# Patient Record
Sex: MALE | Race: WHITE | Employment: UNEMPLOYED | URBAN - METROPOLITAN AREA
[De-identification: names, ages, dates, MRNs, and addresses within clinical notes are randomized per-mention and may not be internally consistent; named-entity substitution may affect disease eponyms.]

---

## 2017-05-03 ENCOUNTER — OFFICE VISIT (OUTPATIENT)
Dept: NEPHROLOGY | Facility: CLINIC | Age: 37
End: 2017-05-03

## 2017-05-03 VITALS
DIASTOLIC BLOOD PRESSURE: 98 MMHG | HEART RATE: 68 BPM | RESPIRATION RATE: 16 BRPM | WEIGHT: 212 LBS | BODY MASS INDEX: 31 KG/M2 | SYSTOLIC BLOOD PRESSURE: 154 MMHG

## 2017-05-03 DIAGNOSIS — I10 ESSENTIAL HYPERTENSION: Primary | ICD-10-CM

## 2017-05-03 PROCEDURE — 99214 OFFICE O/P EST MOD 30 MIN: CPT | Performed by: INTERNAL MEDICINE

## 2017-05-03 RX ORDER — LOSARTAN POTASSIUM 50 MG/1
50 TABLET ORAL DAILY
Qty: 30 TABLET | Refills: 11 | Status: SHIPPED | OUTPATIENT
Start: 2017-05-03 | End: 2017-06-02

## 2017-05-03 RX ORDER — AMLODIPINE BESYLATE 5 MG/1
5 TABLET ORAL DAILY
COMMUNITY
End: 2017-12-14

## 2017-05-03 RX ORDER — PYRIDOXINE HCL (VITAMIN B6) 50 MG
1 TABLET ORAL 2 TIMES DAILY
COMMUNITY

## 2017-05-03 NOTE — PROGRESS NOTES
Nephrology Progress Note      Jakob Yeh is a 31year old male. HPI:   Patient presents with:  Hypertension      Juan Vanegas was seen in the nephrology clinic today in follow-up for management of hypertension.   I have not seen him in at least a yea Relation Age of Onset   • Diabetes Father    • Stroke Maternal Grandmother    • Stroke       Paternal Grandparents   • High Blood Pressure Maternal Grandmother    • High Blood Pressure Father    • COPD [Other] [OTHER] Mother    • Hypothyroidism [Other] [OT 90 11/04/2011   GFRAA > 90 11/04/2011   CA 10.0 11/04/2011   ALKPHO 55 11/04/2011   AST 20 11/04/2011   ALT 37 11/04/2011   BILT 0.3 11/04/2011   TP 7.9 11/04/2011   ALB 4.4 11/04/2011    11/04/2011   K 4.5 11/04/2011    11/04/2011   CO2 29.0 1

## 2017-06-19 RX ORDER — LOSARTAN POTASSIUM 50 MG/1
50 TABLET ORAL DAILY
Qty: 90 TABLET | Refills: 1 | Status: SHIPPED | OUTPATIENT
Start: 2017-06-19 | End: 2017-07-19

## 2017-11-02 ENCOUNTER — OFFICE VISIT (OUTPATIENT)
Dept: NEPHROLOGY | Facility: CLINIC | Age: 37
End: 2017-11-02

## 2017-11-02 VITALS — DIASTOLIC BLOOD PRESSURE: 82 MMHG | BODY MASS INDEX: 31 KG/M2 | WEIGHT: 211 LBS | SYSTOLIC BLOOD PRESSURE: 132 MMHG

## 2017-11-02 DIAGNOSIS — I10 ESSENTIAL HYPERTENSION: Primary | ICD-10-CM

## 2017-11-02 PROCEDURE — 99213 OFFICE O/P EST LOW 20 MIN: CPT | Performed by: INTERNAL MEDICINE

## 2017-11-02 NOTE — PROGRESS NOTES
Nephrology Progress Note      Malena Browning is a 30year old male. HPI:   Patient presents with:  Hypertension      Vincent Moore was seen in the nephrology clinic today in follow-up for management of hypertension.   Since his last clinic visit he report Take 5 mg by mouth daily. Disp:  Rfl:    Pyridoxine HCl (B-6) 100 MG Oral Tab Take 1 tablet by mouth. Disp:  Rfl:    LevETIRAcetam (KEPPRA OR) Take 1,000 mg by mouth 2 (two) times daily.    Disp:  Rfl:    CarBAMazepine  MG Oral Capsule SR 12 Hr Take 3

## 2017-12-14 RX ORDER — LOSARTAN POTASSIUM 50 MG/1
TABLET ORAL
Qty: 90 TABLET | Refills: 1 | OUTPATIENT
Start: 2017-12-14

## 2017-12-14 RX ORDER — LOSARTAN POTASSIUM 50 MG/1
50 TABLET ORAL DAILY
Qty: 90 TABLET | Refills: 1 | Status: SHIPPED | OUTPATIENT
Start: 2017-12-14 | End: 2018-06-21

## 2018-06-21 RX ORDER — LOSARTAN POTASSIUM 50 MG/1
50 TABLET ORAL DAILY
Qty: 90 TABLET | Refills: 1 | Status: SHIPPED | OUTPATIENT
Start: 2018-06-21 | End: 2018-12-15

## 2018-11-08 ENCOUNTER — OFFICE VISIT (OUTPATIENT)
Dept: NEPHROLOGY | Facility: CLINIC | Age: 38
End: 2018-11-08
Payer: COMMERCIAL

## 2018-11-08 VITALS — SYSTOLIC BLOOD PRESSURE: 132 MMHG | WEIGHT: 206 LBS | BODY MASS INDEX: 30 KG/M2 | DIASTOLIC BLOOD PRESSURE: 78 MMHG

## 2018-11-08 DIAGNOSIS — I10 ESSENTIAL HYPERTENSION: Primary | ICD-10-CM

## 2018-11-08 PROCEDURE — 99214 OFFICE O/P EST MOD 30 MIN: CPT | Performed by: INTERNAL MEDICINE

## 2018-11-08 NOTE — PROGRESS NOTES
Nephrology Progress Note      Shelby July is a 41year old male. HPI:   Patient presents with:  Hypertension      Matt Prather returns to the nephrology clinic today in follow-up for management of hypertension.   Since his last clinic visit he reports SR 12 Hr Take 600 mg by mouth 2 (two) times daily.    Disp:  Rfl:        Allergies:  No Known Allergies      ROS:     Denies fever/chills  Denies wt loss/gain  Denies HA or visual changes  Denies CP or palpitations  Denies SOB/cough/hemoptysis  Denies abd o 11/04/2011    EOABSO 0.0 11/04/2011    BAABSO 0.0 11/04/2011     No results found for: Rachael Jeremiah, MIALBURINE, MCRRATIOUR, MALBCRECALC, MICROALBUMIN, CREAUR, MALBCREACALC  No results found for: Kirk Stanley, GEORGE, MAJOR, MAXI, BLOODURINE, P

## 2018-12-17 RX ORDER — LOSARTAN POTASSIUM 50 MG/1
50 TABLET ORAL DAILY
Qty: 90 TABLET | Refills: 1 | Status: SHIPPED | OUTPATIENT
Start: 2018-12-17 | End: 2019-01-16

## 2019-05-21 RX ORDER — LOSARTAN POTASSIUM 50 MG/1
50 TABLET ORAL DAILY
Qty: 90 TABLET | Refills: 1 | Status: SHIPPED | OUTPATIENT
Start: 2019-05-21 | End: 2019-06-20

## 2019-11-06 DIAGNOSIS — I10 ESSENTIAL HYPERTENSION: Primary | ICD-10-CM

## 2019-11-14 ENCOUNTER — OFFICE VISIT (OUTPATIENT)
Dept: NEPHROLOGY | Facility: CLINIC | Age: 39
End: 2019-11-14

## 2019-11-14 ENCOUNTER — TELEPHONE (OUTPATIENT)
Dept: NEPHROLOGY | Facility: CLINIC | Age: 39
End: 2019-11-14

## 2019-11-14 VITALS — DIASTOLIC BLOOD PRESSURE: 88 MMHG | SYSTOLIC BLOOD PRESSURE: 154 MMHG | BODY MASS INDEX: 30 KG/M2 | WEIGHT: 205 LBS

## 2019-11-14 DIAGNOSIS — I10 ESSENTIAL HYPERTENSION: Primary | ICD-10-CM

## 2019-11-14 RX ORDER — LOSARTAN POTASSIUM 100 MG/1
100 TABLET ORAL DAILY
Qty: 90 TABLET | Refills: 3 | Status: SHIPPED | OUTPATIENT
Start: 2019-11-14

## 2019-11-14 NOTE — TELEPHONE ENCOUNTER
Yesterday, I called pt's cell & home number, trying to confirm today's appt. No answer at either number. Pt failed to keep appt today.

## 2019-11-14 NOTE — PROGRESS NOTES
Nephrology Progress Note      Olvin Snell is a 42year old male. HPI:   Patient presents with:  Hypertension      Mr. Latrice Barone was seen in the nephrology clinic today in follow-up for management of hypertension.   Since his last clinic visit he notes th Allergies:  No Known Allergies      ROS:     Denies fever/chills  Denies wt loss/gain  Denies HA or visual changes  Denies CP or palpitations  Denies SOB/cough/hemoptysis  Denies abd or flank pain  Denies N/V/D  Denies change in urinary habits or duglas

## 2020-04-02 ENCOUNTER — LAB ENCOUNTER (OUTPATIENT)
Dept: LAB | Facility: HOSPITAL | Age: 40
End: 2020-04-02
Attending: INTERNAL MEDICINE
Payer: MEDICAID

## 2020-04-02 ENCOUNTER — TELEPHONE (OUTPATIENT)
Dept: NEPHROLOGY | Facility: CLINIC | Age: 40
End: 2020-04-02

## 2020-04-02 DIAGNOSIS — R60.9 EDEMA, UNSPECIFIED TYPE: ICD-10-CM

## 2020-04-02 DIAGNOSIS — R60.9 EDEMA, UNSPECIFIED TYPE: Primary | ICD-10-CM

## 2020-04-02 PROCEDURE — 81003 URINALYSIS AUTO W/O SCOPE: CPT

## 2020-04-02 PROCEDURE — 36415 COLL VENOUS BLD VENIPUNCTURE: CPT

## 2020-04-02 PROCEDURE — 80053 COMPREHEN METABOLIC PANEL: CPT

## 2020-04-02 PROCEDURE — 82570 ASSAY OF URINE CREATININE: CPT

## 2020-04-02 PROCEDURE — 84156 ASSAY OF PROTEIN URINE: CPT

## 2020-04-02 PROCEDURE — 85025 COMPLETE CBC W/AUTO DIFF WBC: CPT

## 2020-04-02 RX ORDER — CHLORTHALIDONE 25 MG/1
25 TABLET ORAL DAILY
Qty: 30 TABLET | Refills: 0 | Status: SHIPPED | OUTPATIENT
Start: 2020-04-02 | End: 2020-05-02

## 2020-04-02 NOTE — TELEPHONE ENCOUNTER
Dr. Anai Elias pt called. His feet are so swollen he can hardly get his shoes on. Pls call him. [phone].

## 2020-04-02 NOTE — TELEPHONE ENCOUNTER
Pt called about worsening edema over the last few months; now can't get shoes on.   On losartan  Not taking any diuretics  Will have him come in for lab check  Asked pt to monitor BP  Low salt diet    He will plan to come in today for above; will f/u

## 2020-04-28 ENCOUNTER — TELEPHONE (OUTPATIENT)
Dept: NEPHROLOGY | Facility: CLINIC | Age: 40
End: 2020-04-28

## 2020-04-29 ENCOUNTER — TELEPHONE (OUTPATIENT)
Dept: NEPHROLOGY | Facility: CLINIC | Age: 40
End: 2020-04-29

## 2020-04-29 RX ORDER — FUROSEMIDE 20 MG/1
20 TABLET ORAL 2 TIMES DAILY
Qty: 30 TABLET | Refills: 3 | Status: SHIPPED | OUTPATIENT
Start: 2020-04-29

## 2020-04-29 NOTE — TELEPHONE ENCOUNTER
Pt called today to discuss swelling in feet/ankles B. He had called earlier in the month and chlorthalidone was given but no sig improvement. Urinalysis was done then and was normal and creat normal as well.   Would try furosemide 20 mg daily and stop chl

## 2020-11-24 ENCOUNTER — NURSE ONLY (OUTPATIENT)
Dept: NEPHROLOGY | Facility: CLINIC | Age: 40
End: 2020-11-24

## 2020-11-24 ENCOUNTER — TELEPHONE (OUTPATIENT)
Dept: NEPHROLOGY | Facility: CLINIC | Age: 40
End: 2020-11-24

## 2024-01-01 ENCOUNTER — HOSPITAL ENCOUNTER (EMERGENCY)
Facility: HOSPITAL | Age: 44
Discharge: HOME | End: 2024-01-02
Attending: EMERGENCY MEDICINE
Payer: MEDICARE

## 2024-01-01 ENCOUNTER — APPOINTMENT (OUTPATIENT)
Dept: GENERAL RADIOLOGY | Facility: HOSPITAL | Age: 44
End: 2024-01-01
Attending: EMERGENCY MEDICINE
Payer: MEDICARE

## 2024-01-01 DIAGNOSIS — G89.29 CHRONIC ABDOMINAL PAIN: Primary | ICD-10-CM

## 2024-01-01 DIAGNOSIS — R19.7 NAUSEA VOMITING AND DIARRHEA: ICD-10-CM

## 2024-01-01 DIAGNOSIS — D72.829 LEUKOCYTOSIS, UNSPECIFIED TYPE: ICD-10-CM

## 2024-01-01 DIAGNOSIS — R11.2 NAUSEA VOMITING AND DIARRHEA: ICD-10-CM

## 2024-01-01 DIAGNOSIS — R10.9 CHRONIC ABDOMINAL PAIN: Primary | ICD-10-CM

## 2024-01-01 LAB
ALBUMIN SERPL-MCNC: 3.9 G/DL (ref 3.4–5)
ALBUMIN/GLOB SERPL: 1.2 {RATIO} (ref 1–2)
ALP LIVER SERPL-CCNC: 132 U/L
ALT SERPL-CCNC: 52 U/L
ANION GAP SERPL CALC-SCNC: 3 MMOL/L (ref 0–18)
AST SERPL-CCNC: 28 U/L (ref 15–37)
BASOPHILS # BLD AUTO: 0.07 X10(3) UL (ref 0–0.2)
BASOPHILS NFR BLD AUTO: 0.4 %
BILIRUB SERPL-MCNC: 0.2 MG/DL (ref 0.1–2)
BILIRUB UR QL STRIP.AUTO: NEGATIVE
BUN BLD-MCNC: 15 MG/DL (ref 9–23)
CALCIUM BLD-MCNC: 9 MG/DL (ref 8.5–10.1)
CHLORIDE SERPL-SCNC: 113 MMOL/L (ref 98–112)
CLARITY UR REFRACT.AUTO: CLEAR
CO2 SERPL-SCNC: 26 MMOL/L (ref 21–32)
COLOR UR AUTO: YELLOW
CREAT BLD-MCNC: 0.94 MG/DL
EGFRCR SERPLBLD CKD-EPI 2021: 104 ML/MIN/1.73M2 (ref 60–?)
EOSINOPHIL # BLD AUTO: 0.33 X10(3) UL (ref 0–0.7)
EOSINOPHIL NFR BLD AUTO: 2 %
ERYTHROCYTE [DISTWIDTH] IN BLOOD BY AUTOMATED COUNT: 13 %
FLUAV + FLUBV RNA SPEC NAA+PROBE: NEGATIVE
FLUAV + FLUBV RNA SPEC NAA+PROBE: NEGATIVE
GLOBULIN PLAS-MCNC: 3.3 G/DL (ref 2.8–4.4)
GLUCOSE BLD-MCNC: 116 MG/DL (ref 70–99)
GLUCOSE UR STRIP.AUTO-MCNC: NORMAL MG/DL
HCT VFR BLD AUTO: 44.2 %
HGB BLD-MCNC: 14.9 G/DL
IMM GRANULOCYTES # BLD AUTO: 0.07 X10(3) UL (ref 0–1)
IMM GRANULOCYTES NFR BLD: 0.4 %
KETONES UR STRIP.AUTO-MCNC: NEGATIVE MG/DL
LEUKOCYTE ESTERASE UR QL STRIP.AUTO: NEGATIVE
LIPASE SERPL-CCNC: 53 U/L (ref 13–75)
LYMPHOCYTES # BLD AUTO: 2.99 X10(3) UL (ref 1–4)
LYMPHOCYTES NFR BLD AUTO: 18.6 %
MCH RBC QN AUTO: 29.2 PG (ref 26–34)
MCHC RBC AUTO-ENTMCNC: 33.7 G/DL (ref 31–37)
MCV RBC AUTO: 86.7 FL
MONOCYTES # BLD AUTO: 1.1 X10(3) UL (ref 0.1–1)
MONOCYTES NFR BLD AUTO: 6.8 %
NEUTROPHILS # BLD AUTO: 11.54 X10 (3) UL (ref 1.5–7.7)
NEUTROPHILS # BLD AUTO: 11.54 X10(3) UL (ref 1.5–7.7)
NEUTROPHILS NFR BLD AUTO: 71.8 %
NITRITE UR QL STRIP.AUTO: NEGATIVE
OSMOLALITY SERPL CALC.SUM OF ELEC: 296 MOSM/KG (ref 275–295)
PH UR STRIP.AUTO: 6 [PH] (ref 5–8)
PLATELET # BLD AUTO: 549 10(3)UL (ref 150–450)
POTASSIUM SERPL-SCNC: 3.5 MMOL/L (ref 3.5–5.1)
PROT SERPL-MCNC: 7.2 G/DL (ref 6.4–8.2)
RBC # BLD AUTO: 5.1 X10(6)UL
RBC UR QL AUTO: NEGATIVE
RSV RNA SPEC NAA+PROBE: NEGATIVE
SARS-COV-2 RNA RESP QL NAA+PROBE: NOT DETECTED
SODIUM SERPL-SCNC: 142 MMOL/L (ref 136–145)
SP GR UR STRIP.AUTO: 1.03 (ref 1–1.03)
UROBILINOGEN UR STRIP.AUTO-MCNC: NORMAL MG/DL
WBC # BLD AUTO: 16.1 X10(3) UL (ref 4–11)

## 2024-01-01 PROCEDURE — 96360 HYDRATION IV INFUSION INIT: CPT

## 2024-01-01 PROCEDURE — 82077 ASSAY SPEC XCP UR&BREATH IA: CPT | Performed by: EMERGENCY MEDICINE

## 2024-01-01 PROCEDURE — 85025 COMPLETE CBC W/AUTO DIFF WBC: CPT | Performed by: EMERGENCY MEDICINE

## 2024-01-01 PROCEDURE — 71045 X-RAY EXAM CHEST 1 VIEW: CPT | Performed by: EMERGENCY MEDICINE

## 2024-01-01 PROCEDURE — 80053 COMPREHEN METABOLIC PANEL: CPT | Performed by: EMERGENCY MEDICINE

## 2024-01-01 PROCEDURE — 96361 HYDRATE IV INFUSION ADD-ON: CPT

## 2024-01-01 PROCEDURE — 81003 URINALYSIS AUTO W/O SCOPE: CPT | Performed by: EMERGENCY MEDICINE

## 2024-01-01 PROCEDURE — 83690 ASSAY OF LIPASE: CPT | Performed by: EMERGENCY MEDICINE

## 2024-01-01 PROCEDURE — 0241U SARS-COV-2/FLU A AND B/RSV BY PCR (GENEXPERT): CPT | Performed by: EMERGENCY MEDICINE

## 2024-01-01 PROCEDURE — 99284 EMERGENCY DEPT VISIT MOD MDM: CPT

## 2024-01-01 PROCEDURE — 87430 STREP A AG IA: CPT | Performed by: EMERGENCY MEDICINE

## 2024-01-01 PROCEDURE — 99285 EMERGENCY DEPT VISIT HI MDM: CPT

## 2024-01-02 ENCOUNTER — APPOINTMENT (OUTPATIENT)
Dept: CT IMAGING | Facility: HOSPITAL | Age: 44
End: 2024-01-02
Attending: EMERGENCY MEDICINE
Payer: MEDICARE

## 2024-01-02 VITALS
RESPIRATION RATE: 16 BRPM | OXYGEN SATURATION: 97 % | TEMPERATURE: 99 F | HEART RATE: 84 BPM | WEIGHT: 205 LBS | DIASTOLIC BLOOD PRESSURE: 80 MMHG | SYSTOLIC BLOOD PRESSURE: 150 MMHG | BODY MASS INDEX: 30.36 KG/M2 | HEIGHT: 69 IN

## 2024-01-02 LAB
AMPHET UR QL SCN: NEGATIVE
BENZODIAZ UR QL SCN: NEGATIVE
CANNABINOIDS UR QL SCN: NEGATIVE
COCAINE UR QL: NEGATIVE
CREAT UR-SCNC: 217 MG/DL
ETHANOL SERPL-MCNC: <3 MG/DL (ref ?–3)
MDMA UR QL SCN: NEGATIVE
OPIATES UR QL SCN: NEGATIVE
OXYCODONE UR QL SCN: NEGATIVE

## 2024-01-02 PROCEDURE — 74177 CT ABD & PELVIS W/CONTRAST: CPT | Performed by: EMERGENCY MEDICINE

## 2024-01-02 PROCEDURE — 80307 DRUG TEST PRSMV CHEM ANLYZR: CPT | Performed by: EMERGENCY MEDICINE

## 2024-01-02 NOTE — ED PROVIDER NOTES
Patient Seen in: [facility] Emergency Department      History     Chief Complaint   Patient presents with    Abdomen/Flank Pain    Nausea/Vomiting/Diarrhea     Stated Complaint: abd pain, + N/V/D    Subjective:   HPI    Patient is a 43-year-old male presenting to the ED with acute exacerbation of chronic abdominal pain.  Patient reports that he has had generalized abdominal pain for months but felt like it was worse this evening prompting him to come in for further evaluation.  He also experiences intermittent nausea, vomiting, and diarrhea.  Patient reports a single episode of vomiting in the last 24 hours and a single episode of loose stool in the last 24 hours.  No fevers or chills.  No prior abdominal surgery.  Patient does report a history of epilepsy.  He also states he feels stressed as he recently moved home from [place] to take care of his parents who are ill.  No known sick contacts.  No exposure to foodborne illness.  No prior diagnosis of underlying GI illness.  No recent change in medications.  No change in appetite or diet.  No alcohol or drugs.    Objective:   Past Medical History:   Diagnosis Date    Acute bronchitis     Acute frontal sinusitis     Acute maxillary sinusitis     Acute pharyngitis     AOM (acute otitis media)     left    Chronic diarrhea     Club foot     Cough     acute    Epilepsy (HCC)     Lipid screening 11/04/2011              Past Surgical History:   Procedure Laterality Date    OTHER SURGICAL HISTORY  1982    surgical repair of club foot                Social History     Socioeconomic History    Marital status:    Tobacco Use    Smoking status: Never    Smokeless tobacco: Never   Substance and Sexual Activity    Alcohol use: Not Currently     Comment: Nonalcohol beer    Drug use: Never   Other Topics Concern    Caffeine Concern Yes              Review of Systems    Positive for stated complaint: abd pain, + N/V/D  Other systems are as noted in HPI.  Constitutional  and vital signs reviewed.      All other systems reviewed and negative except as noted above.    Physical Exam     ED Triage Vitals [01/01/24 2140]   BP (!) 150/92   Pulse 96   Resp 12   Temp 99.3 °F (37.4 °C)   Temp src Oral   SpO2 100 %   O2 Device None (Room air)       Current:/80   Pulse 84   Temp 99.3 °F (37.4 °C) (Oral)   Resp 16   Ht 175.3 cm (5' 9\")   Wt 93 kg   SpO2 97%   BMI 30.28 kg/m²         Physical Exam  Vitals and nursing note reviewed.   Constitutional:       General: He is not in acute distress.     Appearance: Normal appearance. He is well-developed. He is not ill-appearing.   HENT:      Head: Normocephalic and atraumatic.      Right Ear: External ear normal.      Left Ear: External ear normal.      Nose: Nose normal.      Mouth/Throat:      Mouth: Mucous membranes are moist.      Pharynx: Oropharynx is clear. No posterior oropharyngeal erythema.   Eyes:      Conjunctiva/sclera: Conjunctivae normal.   Cardiovascular:      Rate and Rhythm: Normal rate and regular rhythm.   Pulmonary:      Effort: Pulmonary effort is normal. No respiratory distress.      Breath sounds: Normal breath sounds.   Abdominal:      General: Abdomen is flat. Bowel sounds are normal. There is no distension.      Palpations: Abdomen is soft.      Tenderness: There is abdominal tenderness. There is no guarding or rebound.      Comments: Minimal diffuse tenderness on exam without localization.   Musculoskeletal:      Right lower leg: No edema.      Left lower leg: No edema.   Skin:     General: Skin is warm.      Capillary Refill: Capillary refill takes less than 2 seconds.      Findings: No rash.   Neurological:      General: No focal deficit present.      Mental Status: He is alert and oriented to person, place, and time.   Psychiatric:         Mood and Affect: Mood normal.         Behavior: Behavior normal.               ED Course     Labs Reviewed   COMP METABOLIC PANEL (14) - Abnormal; Notable for the  following components:       Result Value    Glucose 116 (*)     Chloride 113 (*)     Calculated Osmolality 296 (*)     Alkaline Phosphatase 132 (*)     All other components within normal limits   URINALYSIS, ROUTINE - Abnormal; Notable for the following components:    Protein Urine Trace (*)     All other components within normal limits   CBC W/ DIFFERENTIAL - Abnormal; Notable for the following components:    WBC 16.1 (*)     .0 (*)     Neutrophil Absolute Prelim 11.54 (*)     Neutrophil Absolute 11.54 (*)     Monocyte Absolute 1.10 (*)     All other components within normal limits   LIPASE - Normal   ETHYL ALCOHOL - Normal   SARS-COV-2/FLU A AND B/RSV BY PCR (GENEXPERT) - Normal    Narrative:     This test is intended for the qualitative detection and differentiation of SARS-CoV-2, influenza A, influenza B, and respiratory syncytial virus (RSV) viral RNA in nasopharyngeal or nares swabs from individuals suspected of respiratory viral infection consistent with COVID-19 by their healthcare provider. Signs and symptoms of respiratory viral infection due to SARS-CoV-2, influenza, and RSV can be similar.    Test performed using the Xpert Xpress SARS-CoV-2/FLU/RSV (real time RT-PCR)  assay on the [employer] instrument, [employer], [place] [address].   This test is being used under the Food and Drug Administration's Emergency Use Authorization.    The authorized Fact Sheet for Healthcare Providers for this assay is available upon request from the laboratory.   RAPID STREP A SCREEN (LC) - Normal    Narrative:     A confirmatory culture is recommended if clinically indicated.   CBC WITH DIFFERENTIAL WITH PLATELET    Narrative:     The following orders were created for panel order CBC With Differential With Platelet.  Procedure                               Abnormality         Status                     ---------                               -----------         ------                     CBC W/ DIFFERENTIAL[[id number]]           Abnormal            Final result                 Please view results for these tests on the individual orders.   DRUG SCREEN 7 W/OUT CONFIRMATION, URINE    Narrative:     Results of the Urine Drug Screen should be used only for medical purposes.   RAINBOW DRAW LAVENDER   RAINBOW DRAW LIGHT GREEN   RAINBOW DRAW GOLD                     MDM      History obtained from patient.     Differential diagnosis includes IBS, inflammatory bowel disease, food intolerance, viral illness, colitis, pancreatitis, gallbladder etiology.    Previous records reviewed.  Per neurology notes, patient has a history of cognitive impairment.  Patient did have recent visit for diarrhea.  This was December 27, 2023.  It also appears that there is a previous diagnosis of irritable bowel syndrome noted on September 26, 2023.    Testing considered and ordered includes CBC, CMP, lipase, viral testing, UA.  Strep test was ordered by triage.  There was note that patient may have appeared intoxicated although I believe that his appearance is secondary to chronic underlying cognitive impairment as noted in previous medical records.  The patient answers all questions appropriately but is slow to answer questions at times.  His alcohol was also ordered given concern expressed by triage notes.  UDS also ordered.    I reviewed all results.  CBC with leukocytosis and WBC at 16.1 with neutrophilic shift.  Platelets 549.  CMP also reviewed, unremarkable other than chloride at 113.  UA negative.  Alcohol and UDS negative.  Lipase normal.  Strep testing negative.  Viral testing negative.  CT was ordered as patient's temp on arrival was 99.3 with WBC at 16.1 although he has minimal tenderness on exam.  No complaints of any cough or URI symptoms, however chest x-ray was ordered to further out any potential source for infection.    Chest x-ray was independently reviewed by myself without any findings for infiltrate or consolidation.  I also reviewed the  official report which shows        XR CHEST AP PORTABLE  (CPT=71045)    Result Date: 1/1/2024  PROCEDURE:  XR CHEST AP PORTABLE  (CPT=71045)  TECHNIQUE:  AP chest radiograph was obtained.  COMPARISON:  None.  INDICATIONS:  abd pain, + N/V/D  PATIENT STATED HISTORY: (As transcribed by Technologist)  Patient complains of epigastric pain with nausea, vomting, cough, sweating episodes.    FINDINGS:  Lung volumes are upper normal.  No consolidation.  CP angles are sharp.  Heart and pulmonary vessels are normal caliber.  Mediastinal contours are smooth.             CONCLUSION:  No evidence of active cardiopulmonary disease.   LOCATION:  [facility]      Dictated by (CST): Austyn Baer MD on 1/01/2024 at 11:12 PM     Finalized by (CST): Austyn Baer MD on 1/01/2024 at 11:12 PM        Comment: CT ABDOMEN PELVIS WITH IV CONTRAST      IMPRESSION:  CT ABDOMEN PELVIS:  -No evidence of acute intra-abdominal or intra-pelvic abnormality.    -Unremarkable liver, gallbladder, stomach, spleen, pancreas, adrenal glands, kidneys and aorta.  -Unremarkable appendix, small bowel and large bowel.  -No ascites or pneumoperitoneum.      Interventions in care included normal saline bolus.  Patient did not require anything additional at this time for pain.  No active nausea or vomiting in the ED.      Patient was screened and evaluated during this visit.  As the treating physician attending to the patient, I determined within reasonable clinical confidence and prior to discharge, that an emergency medical condition was not or was no longer present.  There was no indication for further evaluation, treatment, or admission on an emergency basis.  Comprehensive verbal and written discharge and follow-up instructions were provided to help prevent relapse or worsening.  Patient was instructed to follow-up with their primary care provider for further evaluation and treatment, return immediately to ER for worsening, concerning, new, or changing/persisting  symptoms. I discussed the case with the patient and they had no questions, complaints, or concerns.  Patient was comfortable going home.  May require further evaluation outpatient GI given chronicity of symptoms.  Patient did not have any diarrhea in the ED she was sent for stool studies.                                           Medical Decision Making      Disposition and Plan     Clinical Impression:  1. Chronic abdominal pain    2. Nausea vomiting and diarrhea    3. Leukocytosis, unspecified type         Disposition:  Discharge  1/2/2024  2:13 am    Follow-up:  Edin Newton MD  [address] [address]  [phone]    Schedule an appointment as soon as possible for a visit in 2 day(s)      [facility] GASTROENTEROLOGY [address] [address]  Schedule an appointment as soon as possible for a visit in 2 day(s)      [facility] Emergency Department  [address] [place] [id number] [phone]  Follow up  IF SYMPTOMS WORSEN, PERSIST, OR NEW SYMPTOMS DEVEL          Medications Prescribed:  Discharge Medication List as of 1/2/2024  2:14 AM

## 2024-01-02 NOTE — ED INITIAL ASSESSMENT (HPI)
Patient states he's been vomiting for months. Having constant diarrhea. Cough, throwing up. No appetite. States he's \"all stressed out. I don't know what to do. My stomach is all pains. I don't know if it's nerves or what.\"   Patient states he's been seeing doctors in [place], [place] without any answers.   Odd affect ?Intoxication  States he would like to stay here tonight, it's \"all chaos where I live. My parents. And their medical problems.\" Denies SI/HI and homelessness.

## 2024-10-24 ENCOUNTER — TELEPHONE (OUTPATIENT)
Dept: INTERNAL MEDICINE | Age: 44
End: 2024-10-24

## 2024-11-01 ENCOUNTER — OFFICE VISIT (OUTPATIENT)
Dept: FAMILY MEDICINE | Age: 44
End: 2024-11-01

## 2024-11-01 VITALS
TEMPERATURE: 97.4 F | HEART RATE: 75 BPM | HEIGHT: 69 IN | DIASTOLIC BLOOD PRESSURE: 82 MMHG | SYSTOLIC BLOOD PRESSURE: 110 MMHG | WEIGHT: 240 LBS | BODY MASS INDEX: 35.55 KG/M2 | OXYGEN SATURATION: 100 %

## 2024-11-01 DIAGNOSIS — K58.1 IRRITABLE BOWEL SYNDROME WITH CONSTIPATION: ICD-10-CM

## 2024-11-01 DIAGNOSIS — E66.01 CLASS 2 SEVERE OBESITY WITH BODY MASS INDEX (BMI) OF 35 TO 39.9 WITH SERIOUS COMORBIDITY (CMD): ICD-10-CM

## 2024-11-01 DIAGNOSIS — E66.812 CLASS 2 SEVERE OBESITY WITH BODY MASS INDEX (BMI) OF 35 TO 39.9 WITH SERIOUS COMORBIDITY (CMD): ICD-10-CM

## 2024-11-01 DIAGNOSIS — Q66.89 CLUBFOOT OF BOTH LOWER EXTREMITIES: ICD-10-CM

## 2024-11-01 DIAGNOSIS — G40.909 SEIZURE DISORDER  (CMD): ICD-10-CM

## 2024-11-01 DIAGNOSIS — E78.2 HYPERLIPIDEMIA, MIXED: ICD-10-CM

## 2024-11-01 DIAGNOSIS — Z23 NEED FOR INFLUENZA VACCINATION: Primary | ICD-10-CM

## 2024-11-01 DIAGNOSIS — Z23 NEED FOR COVID-19 VACCINE: ICD-10-CM

## 2024-11-01 RX ORDER — LEVETIRACETAM 1000 MG/1
2000 TABLET ORAL
COMMUNITY
Start: 2024-07-03 | End: 2025-01-01

## 2024-11-01 RX ORDER — DICYCLOMINE HCL 20 MG
10-20 TABLET ORAL 3 TIMES DAILY PRN
COMMUNITY
Start: 2024-07-23

## 2024-11-01 RX ORDER — IBUPROFEN 200 MG
CAPSULE ORAL
COMMUNITY
Start: 2024-07-20

## 2024-11-01 RX ORDER — ZONISAMIDE 100 MG/1
300 CAPSULE ORAL
COMMUNITY
Start: 2024-07-03 | End: 2024-12-31

## 2024-11-01 RX ORDER — PREGABALIN 150 MG/1
CAPSULE ORAL
COMMUNITY

## 2024-11-01 RX ORDER — LACOSAMIDE 200 MG/1
200 TABLET ORAL
COMMUNITY
Start: 2024-07-06 | End: 2025-01-03

## 2024-11-01 RX ORDER — CLOBAZAM 10 MG/1
10 TABLET ORAL
COMMUNITY
Start: 2024-07-06

## 2024-11-01 RX ORDER — ERYTHROMYCIN 5 MG/G
OINTMENT OPHTHALMIC
COMMUNITY
Start: 2024-06-05

## 2024-11-01 RX ORDER — CARBAMAZEPINE 300 MG/1
600 CAPSULE, EXTENDED RELEASE ORAL
COMMUNITY

## 2024-11-01 RX ORDER — LOSARTAN POTASSIUM AND HYDROCHLOROTHIAZIDE 25; 100 MG/1; MG/1
TABLET ORAL
COMMUNITY

## 2024-11-01 RX ORDER — FUROSEMIDE 20 MG/1
20 TABLET ORAL DAILY
COMMUNITY

## 2024-11-01 RX ORDER — CELECOXIB 200 MG/1
CAPSULE ORAL
COMMUNITY

## 2024-11-01 RX ORDER — ESOMEPRAZOLE MAGNESIUM 40 MG/1
CAPSULE, DELAYED RELEASE ORAL PRN
COMMUNITY
Start: 2024-07-23

## 2024-11-01 RX ORDER — TRAZODONE HYDROCHLORIDE 50 MG/1
TABLET, FILM COATED ORAL
COMMUNITY
Start: 2024-07-23

## 2024-11-01 SDOH — ECONOMIC STABILITY: FOOD INSECURITY: WITHIN THE PAST 12 MONTHS, THE FOOD YOU BOUGHT JUST DIDN'T LAST AND YOU DIDN'T HAVE MONEY TO GET MORE.: OFTEN TRUE

## 2024-11-01 SDOH — ECONOMIC STABILITY: HOUSING INSECURITY: WHAT IS YOUR LIVING SITUATION TODAY?: I HAVE A PLACE TO LIVE TODAY, BUT I AM WORRIED ABOUT LOSING IT IN THE FUTURE

## 2024-11-01 SDOH — ECONOMIC STABILITY: FOOD INSECURITY: WITHIN THE PAST 12 MONTHS, THE FOOD YOU BOUGHT JUST DIDN'T LAST AND YOU DIDN'T HAVE MONEY TO GET MORE.: SOMETIMES TRUE

## 2024-11-01 SDOH — ECONOMIC STABILITY: HOUSING INSECURITY: DO YOU HAVE PROBLEMS WITH ANY OF THE FOLLOWING?: MOLD

## 2024-11-01 SDOH — ECONOMIC STABILITY: GENERAL: WOULD YOU LIKE HELP WITH ANY OF THE FOLLOWING NEEDS?: FOOD;HOUSING;UTILITIES;TRANSPORTATION

## 2024-11-01 SDOH — ECONOMIC STABILITY: TRANSPORTATION INSECURITY
IN THE PAST 12 MONTHS, HAS LACK OF RELIABLE TRANSPORTATION KEPT YOU FROM MEDICAL APPOINTMENTS, MEETINGS, WORK OR FROM GETTING THINGS NEEDED FOR DAILY LIVING?: NO

## 2024-11-01 ASSESSMENT — PATIENT HEALTH QUESTIONNAIRE - PHQ9
SUM OF ALL RESPONSES TO PHQ9 QUESTIONS 1 AND 2: 2
2. FEELING DOWN, DEPRESSED OR HOPELESS: SEVERAL DAYS
1. LITTLE INTEREST OR PLEASURE IN DOING THINGS: SEVERAL DAYS
CLINICAL INTERPRETATION OF PHQ2 SCORE: NO FURTHER SCREENING NEEDED
SUM OF ALL RESPONSES TO PHQ9 QUESTIONS 1 AND 2: 2

## 2024-11-01 ASSESSMENT — SOCIAL DETERMINANTS OF HEALTH (SDOH): IN THE PAST 12 MONTHS, HAS THE ELECTRIC, GAS, OIL, OR WATER COMPANY THREATENED TO SHUT OFF SERVICE IN YOUR HOME?: YES

## 2024-11-11 ENCOUNTER — TELEPHONE (OUTPATIENT)
Dept: PODIATRY | Age: 44
End: 2024-11-11

## 2024-11-12 ENCOUNTER — TELEPHONE (OUTPATIENT)
Dept: GASTROENTEROLOGY | Age: 44
End: 2024-11-12

## 2024-11-22 DIAGNOSIS — K58.2 IRRITABLE BOWEL SYNDROME WITH BOTH CONSTIPATION AND DIARRHEA: ICD-10-CM

## 2024-11-22 DIAGNOSIS — L98.9 SKIN ABNORMALITY: ICD-10-CM

## 2024-11-22 DIAGNOSIS — K21.9 GASTROESOPHAGEAL REFLUX DISEASE, UNSPECIFIED WHETHER ESOPHAGITIS PRESENT: Primary | ICD-10-CM

## 2024-11-25 RX ORDER — ESOMEPRAZOLE MAGNESIUM 40 MG/1
40 CAPSULE, DELAYED RELEASE ORAL DAILY
Qty: 90 CAPSULE | Refills: 1 | Status: SHIPPED | OUTPATIENT
Start: 2024-11-25

## 2024-11-25 RX ORDER — CLOBAZAM 10 MG/1
10 TABLET ORAL
OUTPATIENT
Start: 2024-11-25

## 2024-11-25 RX ORDER — IBUPROFEN 200 MG
CAPSULE ORAL
Qty: 28 G | Refills: 3 | Status: SHIPPED | OUTPATIENT
Start: 2024-11-25

## 2024-11-25 RX ORDER — LACOSAMIDE 200 MG/1
200 TABLET ORAL
Qty: 60 TABLET | OUTPATIENT
Start: 2024-11-25 | End: 2025-05-25

## 2024-11-25 RX ORDER — PREGABALIN 150 MG/1
150 CAPSULE ORAL 2 TIMES DAILY
Qty: 180 CAPSULE | Refills: 1 | OUTPATIENT
Start: 2024-11-25

## 2024-11-25 RX ORDER — DICYCLOMINE HCL 20 MG
10-20 TABLET ORAL 3 TIMES DAILY
Qty: 90 TABLET | Refills: 1 | Status: SHIPPED | OUTPATIENT
Start: 2024-11-25

## 2024-11-29 RX ORDER — LEVETIRACETAM 1000 MG/1
2000 TABLET ORAL
OUTPATIENT
Start: 2024-11-29 | End: 2025-05-30

## 2024-11-29 RX ORDER — CELECOXIB 200 MG/1
CAPSULE ORAL
OUTPATIENT
Start: 2024-11-29

## 2024-11-29 RX ORDER — FUROSEMIDE 20 MG/1
20 TABLET ORAL DAILY
OUTPATIENT
Start: 2024-11-29

## 2024-11-29 RX ORDER — ZONISAMIDE 100 MG/1
300 CAPSULE ORAL
OUTPATIENT
Start: 2024-11-29 | End: 2025-05-29

## 2024-11-29 RX ORDER — TRAZODONE HYDROCHLORIDE 50 MG/1
TABLET, FILM COATED ORAL
OUTPATIENT
Start: 2024-11-29

## 2024-11-29 RX ORDER — ERYTHROMYCIN 5 MG/G
OINTMENT OPHTHALMIC
Qty: 3.5 G | OUTPATIENT
Start: 2024-11-29

## 2024-11-29 RX ORDER — CARBAMAZEPINE 300 MG/1
600 CAPSULE, EXTENDED RELEASE ORAL
OUTPATIENT
Start: 2024-11-29

## 2024-12-04 ENCOUNTER — TELEPHONE (OUTPATIENT)
Dept: INTERNAL MEDICINE | Age: 44
End: 2024-12-04

## 2024-12-04 ENCOUNTER — MED INFO FORMS (OUTPATIENT)
Dept: INTERNAL MEDICINE | Age: 44
End: 2024-12-04

## 2025-03-03 PROCEDURE — 93010 ELECTROCARDIOGRAM REPORT: CPT | Performed by: INTERNAL MEDICINE

## 2025-04-30 ENCOUNTER — APPOINTMENT (OUTPATIENT)
Dept: FAMILY MEDICINE | Age: 45
End: 2025-04-30

## (undated) NOTE — MR AVS SNAPSHOT
[address], [address] [phone]               Thank you for choosing us for your health care visit with Luna Kathleen MD.  We are glad to serve you and happy to provide you with [facility] These medications were sent to [facility], [place], [address]     Phone:  [phone]    - Losartan Potassium 50 MG Tabs            MyChart     Sign up for Women and lighter weight persons: limit to <= 1 drink* per day. Healthy Diet and Regular Exercise  [employer] [address] for making healthy food choices  -   Enjoy your food, but eat less.   Fully enjoy your food when ea